# Patient Record
Sex: FEMALE | Race: WHITE | NOT HISPANIC OR LATINO | URBAN - METROPOLITAN AREA
[De-identification: names, ages, dates, MRNs, and addresses within clinical notes are randomized per-mention and may not be internally consistent; named-entity substitution may affect disease eponyms.]

---

## 2023-11-27 ENCOUNTER — HOSPITAL ENCOUNTER (OUTPATIENT)
Dept: RADIOLOGY | Facility: HOSPITAL | Age: 48
Discharge: HOME | End: 2023-11-27
Payer: COMMERCIAL

## 2023-11-27 DIAGNOSIS — Z12.39 ENCOUNTER FOR OTHER SCREENING FOR MALIGNANT NEOPLASM OF BREAST: ICD-10-CM

## 2023-11-27 PROCEDURE — 2550000001 HC RX 255 CONTRASTS: Performed by: NURSE PRACTITIONER

## 2023-11-27 PROCEDURE — 6100000003 BI MR BREAST BILATERAL WITH CONTRAST FAST SCREENING SELF PAY: Mod: 50

## 2023-11-27 PROCEDURE — A9575 INJ GADOTERATE MEGLUMI 0.1ML: HCPCS | Performed by: NURSE PRACTITIONER

## 2023-11-27 RX ORDER — GADOTERATE MEGLUMINE 376.9 MG/ML
13 INJECTION INTRAVENOUS
Status: COMPLETED | OUTPATIENT
Start: 2023-11-27 | End: 2023-11-27

## 2023-11-27 RX ADMIN — GADOTERATE MEGLUMINE 13 ML: 376.9 INJECTION INTRAVENOUS at 08:28

## 2024-03-08 ENCOUNTER — OFFICE VISIT (OUTPATIENT)
Dept: OBSTETRICS AND GYNECOLOGY | Facility: CLINIC | Age: 49
End: 2024-03-08
Payer: COMMERCIAL

## 2024-03-08 VITALS
BODY MASS INDEX: 23.63 KG/M2 | SYSTOLIC BLOOD PRESSURE: 112 MMHG | DIASTOLIC BLOOD PRESSURE: 71 MMHG | HEIGHT: 66 IN | WEIGHT: 147 LBS

## 2024-03-08 DIAGNOSIS — Z01.419 ENCOUNTER FOR GYNECOLOGICAL EXAMINATION WITHOUT ABNORMAL FINDING: Primary | ICD-10-CM

## 2024-03-08 PROCEDURE — 1036F TOBACCO NON-USER: CPT | Performed by: OBSTETRICS & GYNECOLOGY

## 2024-03-08 PROCEDURE — 99396 PREV VISIT EST AGE 40-64: CPT | Performed by: OBSTETRICS & GYNECOLOGY

## 2024-03-08 RX ORDER — TAMOXIFEN CITRATE 20 MG/1
20 TABLET ORAL DAILY
COMMUNITY
Start: 2020-10-22

## 2024-03-08 RX ORDER — BRIMONIDINE TARTRATE 2 MG/ML
1 SOLUTION/ DROPS OPHTHALMIC 2 TIMES DAILY
COMMUNITY

## 2024-03-08 NOTE — PROGRESS NOTES
Raysa Guillen is a 48 y.o. female here for a routine exam. Current complaints: She has family history of breast cancer.  She follows with breast specialist and had an MRI in 2023.  She alternates with mammograms.    She has regular cycles.  Occasional heavy for 1 or 2 days.  No pain, no dysuria, no change in bowel habits or vaginal discharge.    Her  has vasectomy for contraception.  We discussed her oldest is graduating from high school and they are planning a family trip to [place].   Pt is current on her colonoscopy.. Personal health questionnaire reviewed: yes.     Gynecologic History  Patient's last menstrual period was 2024.  Contraception: vasectomy  Last Pap: 3/3/23. Results were: normal  Last mammogram: 23. Results were: normal    Obstetric History  OB History    Para Term  AB Living   3 2           SAB IAB Ectopic Multiple Live Births                  # Outcome Date GA Lbr Riky/2nd Weight Sex Delivery Anes PTL Lv   3             2 Para            1 Para                Objective   Constitutional: Alert and in no acute distress. Well developed, well nourished.   Head and Face: Head and face: Normal.    Eyes: Normal external exam - nonicteric sclera, extraocular movements intact (EOMI) and no ptosis.   Neck: No neck asymmetry. Supple. Thyroid not enlarged and there were no palpable thyroid nodules.    Pulmonary: No respiratory distress.   Chest: Breasts: Normal appearance, no nipple discharge and no skin changes. Palpation of breasts and axillae: No palpable mass and no axillary lymphadenopathy.   Abdomen: Soft nontender; no abdominal mass palpated. No organomegaly. No hernias.   Genitourinary: External genitalia: Normal. No inguinal lymphadenopathy. Bartholin's Urethral and Skenes Glands: Normal. Urethra: Normal.  Bladder: Normal on palpation. Vagina: Normal. Cervix: Normal.  Uterus: Normal.  Right Adnexa/parametria: Normal.  Left  Adnexa/parametria: Normal.  Inspection of Perianal Area: Normal.   Musculoskeletal: No joint swelling seen, normal movements of all extremities.   Skin: Normal skin color and pigmentation, normal skin turgor, and no rash.   Neurologic: Non-focal. Grossly intact.   Psychiatric: Alert and oriented x 3. Affect normal to patient baseline. Mood: Appropriate.  Physical Exam     Assessment/Plan   Healthy female exam.  This is a 48-year-old female with a normal exam.  No Pap was sent, she is high risk HPV negative.    She is current on her breast imaging including MRI with the breast specialist.  She is current on her colonoscopy.    She uses ibuprofen 600mg intermittently for menses, does not require refill.  I will see her in 1 year.  Education reviewed: self breast exams.

## 2024-04-30 NOTE — PROGRESS NOTES
Violeta Guillen female   1975 48 y.o.   [id number]      Chief Complaint  High risk surveillance care, annual mammogram and exam     History Of Present Illness  Violeta Guillen is a very pleasant 48 year old  female [facility] care. 2020 Libia Gardner performed left breast Magseed localized excisional biopsy demonstrating atypical ductal hyperplasia (ADH), atypical lobular hyperplasia (ALH), small radical scar and other benign findings. She also has a history of right breast excisional biopsy, benign fibroadenoma in 2017. She has family history of breast cancer in her mother, age 51, BRCA negative and maternal aunt, age 51. She has family history of ovarian cancer in another maternal aunt, age 61. She saw Genetics and testing was not recommended because her mother is BRCA negative. 10/2020 she started Tamoxifen for risk reduction, currently taking and tolerating well. She presents today for annual mammogram and exam. She denies any new masses or lumps.      BREAST IMAGIN2023 Bilateral Fast MRI, indicates BI-RADS Category 1. 2023 Bilateral screening mammogram, indicates BI-RADS Category 1.      FEMALE HISTORY: menarche age 14, ,  section Ã--2, first birth 30, did not breastfeed, previous OCPs, premenopausal, LMP 2024, monthly cycles, heterogeneously dense tissue     FAMILY CANCER HISTORY:  Mother: Breast cancer age 51, BRCA negative  Maternal Aunt (1): Breast cancer age 51, uterine cancer age 37, bladder cancer age 49, lung cancer age 71  Maternal Aunt (2): Ovarian cancer age 61  Maternal Grandfather: Lung cancer age 73      Surgical History  She has a past surgical history that includes  section, classic (2017); Breast biopsy (2017); BI mammo guided breast left localization (Left, 2020); Breast lumpectomy (2020); and  section, low transverse (, ).     Social History  She reports  that she has never smoked. She has never used smokeless tobacco. She reports that she does not drink alcohol and does not use drugs.    Family History  Family History   Problem Relation Name Age of Onset    Breast cancer Mother Virginia     Hypertension Father Jan     Arthritis Paternal Grandmother Monse     Diabetes Paternal Grandfather Ezra     Breast cancer Mother's Sister          Allergies  Penicillins    Medications  Current Outpatient Medications   Medication Instructions    brimonidine (AlphaGAN) 0.2 % ophthalmic solution 1 drop, 2 times daily    tamoxifen (NOLVADEX) 20 mg, oral, Daily         REVIEW OF SYSTEMS    Constitutional:  Negative for appetite change, fatigue, fever and unexpected weight change.   HENT:  Negative for ear pain, hearing loss, nosebleeds, sore throat and trouble swallowing.    Eyes:  Negative for discharge, itching and visual disturbance.   Respiratory:  Negative for cough, chest tightness and shortness of breath.    Cardiovascular:  Negative for chest pain, palpitations and leg swelling.   Breast: as indicated in HPI  Gastrointestinal:  Negative for abdominal pain, constipation, diarrhea and nausea.   Endocrine: Negative for cold intolerance and heat intolerance.   Genitourinary:  Negative for dysuria, frequency, hematuria, pelvic pain and vaginal bleeding.   Musculoskeletal:  Negative for arthralgias, back pain, gait problem, joint swelling and myalgias.   Skin:  Negative for color change and rash.   Allergic/Immunologic: Negative for environmental allergies and food allergies.   Neurological:  Negative for dizziness, tremors, speech difficulty, weakness, numbness and headaches.   Hematological:  Does not bruise/bleed easily.   Psychiatric/Behavioral:  Negative for agitation, dysphoric mood and sleep disturbance. The patient is not nervous/anxious.         Past Medical History  She has a past medical history of Benign neoplasm of right breast (05/11/2017), Personal history of  other diseases of the female genital tract (11/13/2014), Personal history of other specified conditions (11/13/2014), and Unspecified lump in the right breast, unspecified quadrant (01/24/2017).     Physical Exam  Patient is alert and oriented x3,and in a relaxed and appropriate mood. Her gait is steady and hand grasps are equal. Sclera is clear. The breasts are small and nearly symmetrical. The right breast has a well-healed partial circumareolar incision from excisional biopsy, benign fibroadenoma. The left breast has a well-healed partial circumareolar incision from excisional biopsy. The tissue of both breasts is dense most of the breast without palpable abnormalities, discrete nodules or masses. The skin and nipples appear normal. There is no cervical, supraclavicular, or axillary lymphadenopathy palpable. Heart rate and rhythm normal, S1 and S2 appreciated. The lungs are clear to auscultation bilaterally. Abdomen is soft, non-tender.     Physical Exam  Chest:              Last Recorded Vitals  Vitals:    05/09/24 0922   BP: 115/76   Pulse: 76   Temp: 37.3 °C (99.1 °F)   SpO2: 100%       Risk Profiles              Relevant Results   Time was spent viewing digital images of the radiology testing with the patient. I explained the results in depth, along with suggested explanation for follow up recommendations based on the testing results. BI-RADS Category 1    Imaging    Narrative & Impression   Interpreted By:  Lesli Ashraf and Abuhamdeh Imran   STUDY:  BI MAMMO BILATERAL SCREENING TOMOSYNTHESIS;  5/9/2024 9:18 am      ACCESSION NUMBER(S):  [id number]      ORDERING CLINICIAN:  DAE DASH      INDICATION:  Screening. History of bilateral benign breast biopsies (atypical  hyperplasia on the left and fibroadenoma in the right). Family  history of breast cancer diagnosed in her mother.      COMPARISON:  05/08/2023, 05/10/2022, and 05/20/2021.      FINDINGS:  2D and tomosynthesis images were reviewed at 1  mm slice thickness.      Density:  The breast tissue is heterogeneously dense, which may  obscure small masses.      The parenchymal pattern is stable. No suspicious masses or  calcifications are identified.      IMPRESSION:  No mammographic evidence of malignancy.      BI-RADS CATEGORY:      BI-RADS Category:  1 Negative.  Recommendation:  Annual Screening.  Recommended Date:  1 Year.  Laterality:  Bilateral.       MR breast bilateral w IV contrast fast screening self pay 11/27/2023    Narrative  Interpreted By:  Ciara Trinh,  STUDY:  BI MR BREAST BILATERAL WITH CONTRAST FAST SCREENING SELF PAY;  11/27/2023 8:35 am    ACCESSION NUMBER(S):  [id number]    ORDERING CLINICIAN:  DAE DASH    INDICATION:  Dense breast tissue on mammography. Bilateral benign excisional  biopsies. Strong family history of breast cancer.    COMPARISON:  Comparison to MRI 11/14/2022, 07/12/2021, 07/10/2019, 10/22/2018  correlation to most recent mammograms 05/08/2023    TECHNIQUE:  Using a dedicated breast coil, STIR axial and T1-weighted fat  saturation axial images of the breasts were obtained, the latter both  before and after intravenous administration of Gadolinium DTPA.    Intravenous contrast:  13 mL of Dotarem    FINDINGS:  Density:  The breast tissue is heterogeneously dense, which may  obscure small masses.    There is symmetric moderate bilateral background enhancement.    RIGHT BREAST: There is post excisional biopsy scarring and  susceptibility artifact in the inferomedial quadrant. No suspicious  mass or nonmass enhancement is identified.    No axillary or internal mammary lymphadenopathy is appreciated.    LEFT BREAST: There is post excisional biopsy scarring and  susceptibility artifact in the 12 o'clock position. No suspicious  mass or nonmass enhancement is identified.    No axillary or internal mammary lymphadenopathy is appreciated.    NON-BREAST FINDINGS:  The T2 hyperintense nodule in the right lung  on  STIR axial slice 35 is similar when compared to priors and previously  characterized on chest CT 07/24/2019.    Impression  No MRI evidence of malignancy in either breast.    BI-RADS CATEGORY:    BI-RADS Category:  2 Benign.  Recommendation:  Routine Screening Mammogram in 1 Year.  Recommended Date:  1 Year.  Laterality:  Bilateral.    For any future breast imaging appointments, please call [phone]  ([phone]).      MACRO:  None    Signed by: Ciara Trinh 11/28/2023 12:16 PM  Dictation workstation:   [id number]       Assessment/Plan   High risk breast surveillance care, normal clinical exam and imaging, history of left ADH/ALH, history of right excisional biopsy, benign fibroadenoma, family history of breast cancer, heterogeneously dense tissue, Tamoxifen therapy     Plan: Return in one year for bilateral screening mammogram and office visit. Fast MRI in November 2024. Continue Tamoxifen 20mg daily until 10/2025.     Patient Discussion/Summary  Your clinical examination and imaging are normal. Please return in one year for bilateral screening mammogram and office visit or sooner if you have any problems or concerns. Continue Tamoxifen 20mg daily.     You can see your health information, review clinical summaries from office visits & test results online when you follow your health with MY  Chart, a personal health record. To sign up go to [email or web address]. If you need assistance with signing up or trouble getting into your account call [facility] Patient Line 24/7 at [phone].    My office phone number is [phone] if you need to get in touch with me or have additional questions or concerns. Thank you for choosing [facility] and trusting me as your healthcare provider. I look forward to seeing you again at your next office visit. I am honored to be a provider on your health care team and I remain dedicated to helping you achieve your health goals.      Annalisa Gaines,  APRN-CNP

## 2024-05-09 ENCOUNTER — HOSPITAL ENCOUNTER (OUTPATIENT)
Dept: RADIOLOGY | Facility: CLINIC | Age: 49
Discharge: HOME | End: 2024-05-09
Payer: COMMERCIAL

## 2024-05-09 ENCOUNTER — OFFICE VISIT (OUTPATIENT)
Dept: SURGICAL ONCOLOGY | Facility: CLINIC | Age: 49
End: 2024-05-09
Payer: COMMERCIAL

## 2024-05-09 VITALS
HEART RATE: 76 BPM | DIASTOLIC BLOOD PRESSURE: 76 MMHG | BODY MASS INDEX: 23.89 KG/M2 | SYSTOLIC BLOOD PRESSURE: 115 MMHG | WEIGHT: 148 LBS | OXYGEN SATURATION: 100 % | TEMPERATURE: 99.1 F

## 2024-05-09 VITALS — BODY MASS INDEX: 24.11 KG/M2 | WEIGHT: 150 LBS | HEIGHT: 66 IN

## 2024-05-09 DIAGNOSIS — Z12.39 ENCOUNTER FOR OTHER SCREENING FOR MALIGNANT NEOPLASM OF BREAST: ICD-10-CM

## 2024-05-09 DIAGNOSIS — Z12.39 BREAST CANCER SCREENING, HIGH RISK PATIENT: Primary | ICD-10-CM

## 2024-05-09 DIAGNOSIS — Z79.810 USE OF TAMOXIFEN (NOLVADEX): ICD-10-CM

## 2024-05-09 DIAGNOSIS — R92.30 DENSE BREAST TISSUE: ICD-10-CM

## 2024-05-09 PROCEDURE — 1036F TOBACCO NON-USER: CPT | Performed by: NURSE PRACTITIONER

## 2024-05-09 PROCEDURE — 77067 SCR MAMMO BI INCL CAD: CPT | Mod: BILATERAL PROCEDURE | Performed by: RADIOLOGY

## 2024-05-09 PROCEDURE — 99214 OFFICE O/P EST MOD 30 MIN: CPT | Performed by: NURSE PRACTITIONER

## 2024-05-09 PROCEDURE — 77067 SCR MAMMO BI INCL CAD: CPT

## 2024-05-09 PROCEDURE — 77063 BREAST TOMOSYNTHESIS BI: CPT | Mod: BILATERAL PROCEDURE | Performed by: RADIOLOGY

## 2024-05-09 ASSESSMENT — PAIN SCALES - GENERAL: PAINLEVEL: 0-NO PAIN

## 2024-05-09 NOTE — PATIENT INSTRUCTIONS
Your clinical examination and imaging are normal. Please return in one year for bilateral screening mammogram and office visit or sooner if you have any problems or concerns. Continue Tamoxifen 20mg daily.     You can see your health information, review clinical summaries from office visits & test results online when you follow your health with MY  Chart, a personal health record. To sign up go to [email or web address]. If you need assistance with signing up or trouble getting into your account call [employer] Patient Line 24/7 at [phone].    My office phone number is [phone] if you need to get in touch with me or have additional questions or concerns. Thank you for choosing [facility] and trusting me as your healthcare provider. I look forward to seeing you again at your next office visit. I am honored to be a provider on your health care team and I remain dedicated to helping you achieve your health goals.

## 2024-06-21 DIAGNOSIS — N92.0 EXCESSIVE AND FREQUENT MENSTRUATION WITH REGULAR CYCLE: ICD-10-CM

## 2024-06-25 RX ORDER — IBUPROFEN 600 MG/1
600 TABLET ORAL EVERY 6 HOURS PRN
Qty: 60 TABLET | Refills: 3 | Status: SHIPPED | OUTPATIENT
Start: 2024-06-25

## 2024-11-11 ENCOUNTER — ANCILLARY PROCEDURE (OUTPATIENT)
Dept: URGENT CARE | Age: 49
End: 2024-11-11
Payer: COMMERCIAL

## 2024-11-11 ENCOUNTER — OFFICE VISIT (OUTPATIENT)
Dept: URGENT CARE | Age: 49
End: 2024-11-11
Payer: COMMERCIAL

## 2024-11-11 VITALS
OXYGEN SATURATION: 98 % | TEMPERATURE: 98 F | SYSTOLIC BLOOD PRESSURE: 124 MMHG | DIASTOLIC BLOOD PRESSURE: 79 MMHG | HEART RATE: 83 BPM

## 2024-11-11 DIAGNOSIS — R05.1 ACUTE COUGH: ICD-10-CM

## 2024-11-11 DIAGNOSIS — R05.1 ACUTE COUGH: Primary | ICD-10-CM

## 2024-11-11 PROCEDURE — 71046 X-RAY EXAM CHEST 2 VIEWS: CPT

## 2024-11-11 RX ORDER — PROMETHAZINE HYDROCHLORIDE AND DEXTROMETHORPHAN HYDROBROMIDE 6.25; 15 MG/5ML; MG/5ML
5 SYRUP ORAL EVERY 4 HOURS PRN
Qty: 120 ML | Refills: 0 | Status: SHIPPED | OUTPATIENT
Start: 2024-11-11 | End: 2024-12-11

## 2024-11-11 NOTE — PROGRESS NOTES
Subjective   History of Present Illness: Violeta Guillen is a 49 y.o. female. They present today with a chief complaint of Cough (Chest congestion for a week). Pt used OTC cough meds to alleviate the cough.         Past Medical History  Allergies as of 2024 - Reviewed 2024   Allergen Reaction Noted    Penicillins Hives, Itching, and Rash 10/30/2010       (Not in a hospital admission)       Past Medical History:   Diagnosis Date    Benign neoplasm of right breast 2017    Fibroadenoma of right breast    Personal history of other diseases of the female genital tract 2014    History of abnormal menstrual cycle    Personal history of other specified conditions 2014    History of abdominal pain    Unspecified lump in the right breast, unspecified quadrant 2017    Breast mass, right       Past Surgical History:   Procedure Laterality Date    BI MAMMO GUIDED BREAST LEFT LOCALIZATION Left 2020    BI MAMMO GUIDED LOCALIZATION BREAST LEFT 2020 [facility]    BREAST BIOPSY  2017    Biopsy Breast Open    BREAST LUMPECTOMY  2020     SECTION, CLASSIC  2017     Section     SECTION, LOW TRANSVERSE  2010        reports that she has never smoked. She has never used smokeless tobacco. She reports that she does not drink alcohol and does not use drugs.    Review of Systems  Review of Systems                               Objective    Vitals:    24 1600   BP: 124/79   Pulse: 83   Temp: 36.7 °C (98 °F)   SpO2: 98%     No LMP recorded. Patient is premenopausal.    Physical Exam  HENT:      Head: Normocephalic and atraumatic.      Nose: Nose normal.      Mouth/Throat:      Mouth: Mucous membranes are moist.   Eyes:      Extraocular Movements: Extraocular movements intact.      Conjunctiva/sclera: Conjunctivae normal.      Pupils: Pupils are equal, round, and reactive to light.   Cardiovascular:      Rate and Rhythm: Normal rate  and regular rhythm.      Heart sounds: No murmur heard.  Pulmonary:      Effort: Pulmonary effort is normal.      Breath sounds: Normal breath sounds. No wheezing, rhonchi or rales.   Skin:     General: Skin is warm.   Neurological:      Mental Status: She is alert and oriented to person, place, and time.   Psychiatric:         Mood and Affect: Mood normal.         Behavior: Behavior normal.             Point of Care Test & Imaging Results from this visit  No results found for this visit on 11/11/24.   XR chest 2 views    Result Date: 11/11/2024  Interpreted By:  Esteban Stone, STUDY: XR CHEST 2 VIEWS; 11/11/2024 4:52 pm   INDICATION: Signs/Symptoms:cough   COMPARISON: 07/16/2019   ACCESSION NUMBER(S): [id number]   ORDERING CLINICIAN: LEVAR QUILES   TECHNIQUE: PA and LAT views of the chest were obtained.   FINDINGS: The cardiomediastinal silhouette is unremarkable. The lungs are clear. No pleural effusion is identified. No significant airspace opacity.   The osseous structures are intact.       No acute cardiopulmonary process.     Signed by: Esteban Stone 11/11/2024 6:09 PM Dictation workstation:   [id number]     Diagnostic study results (if any) were reviewed by Levar Quiles PA-C.    Assessment/Plan   Allergies, medications, history, and pertinent labs/EKGs/Imaging reviewed by Levar Quiles PA-C.     Medical Decision Making  -         Patient is educated about their diagnoses.     -          Discussed medications benefits and adverse effects.     -          Answered all patient’s questions.     -          Patient will call 911 or go to the nearest ED if worsen symptoms .     -          Patient is agreeable to the plan of care and is deemed stable upon discharge.     -          Follow up with your primary care provider in two days.      Orders and Diagnoses  Diagnoses and all orders for this visit:  Acute cough  -     XR chest 2 views; Future  -     promethazine-DM (Phenergan-DM) 6.25-15 mg/5 mL  syrup; Take 5 mL by mouth every 4 hours if needed for cough.      Medical Admin Record      Patient disposition: Home    Electronically signed by Levar Haywood PA-C  6:10 PM

## 2024-11-18 ENCOUNTER — HOSPITAL ENCOUNTER (OUTPATIENT)
Dept: RADIOLOGY | Facility: HOSPITAL | Age: 49
Discharge: HOME | End: 2024-11-18

## 2024-11-18 DIAGNOSIS — R92.30 DENSE BREAST TISSUE: ICD-10-CM

## 2024-11-18 DIAGNOSIS — Z12.39 BREAST CANCER SCREENING, HIGH RISK PATIENT: ICD-10-CM

## 2024-11-18 PROCEDURE — A9575 INJ GADOTERATE MEGLUMI 0.1ML: HCPCS | Performed by: NURSE PRACTITIONER

## 2024-11-18 PROCEDURE — 6100000003 BI MR BREAST BILATERAL WITH CONTRAST FAST SCREENING SELF PAY

## 2024-11-18 PROCEDURE — 2550000001 HC RX 255 CONTRASTS: Performed by: NURSE PRACTITIONER

## 2024-11-18 RX ORDER — GADOTERATE MEGLUMINE 376.9 MG/ML
13 INJECTION INTRAVENOUS
Status: COMPLETED | OUTPATIENT
Start: 2024-11-18 | End: 2024-11-18

## 2024-11-19 DIAGNOSIS — Z12.39 BREAST CANCER SCREENING, HIGH RISK PATIENT: Primary | ICD-10-CM

## 2024-11-19 RX ORDER — TAMOXIFEN CITRATE 20 MG/1
20 TABLET ORAL DAILY
Qty: 90 TABLET | Refills: 3 | Status: SHIPPED | OUTPATIENT
Start: 2024-11-19

## 2025-03-14 ENCOUNTER — APPOINTMENT (OUTPATIENT)
Dept: OBSTETRICS AND GYNECOLOGY | Facility: CLINIC | Age: 50
End: 2025-03-14
Payer: COMMERCIAL

## 2025-03-14 VITALS
HEIGHT: 66 IN | DIASTOLIC BLOOD PRESSURE: 69 MMHG | HEART RATE: 73 BPM | BODY MASS INDEX: 24.43 KG/M2 | SYSTOLIC BLOOD PRESSURE: 105 MMHG | WEIGHT: 152 LBS

## 2025-03-14 DIAGNOSIS — Z01.419 ENCOUNTER FOR GYNECOLOGICAL EXAMINATION WITHOUT ABNORMAL FINDING: Primary | ICD-10-CM

## 2025-03-14 PROCEDURE — 99396 PREV VISIT EST AGE 40-64: CPT | Performed by: OBSTETRICS & GYNECOLOGY

## 2025-03-14 PROCEDURE — 3008F BODY MASS INDEX DOCD: CPT | Performed by: OBSTETRICS & GYNECOLOGY

## 2025-03-14 NOTE — PROGRESS NOTES
Raysa Guillen is a 49 y.o. female here for a routine exam.  Her cycles are typically regular, she did skip 2025.  She has her 's vasectomy for contraception.    She has no pelvic pain, no dysuria, no change in bowel habits or vaginal discharge.    There is family history of breast cancer and she is followed by the breast specialist.  She is on tamoxifen.    She is current on her colonoscopy.  Personal health questionnaire reviewed: yes.     Gynecologic History  Patient's last menstrual period was 2025 (approximate).  Contraception: vasectomy  Last Pap: 3/3/23. Results were: normal  Last mammogram: 23. Results were: normal    Obstetric History  OB History    Para Term  AB Living   3 2           SAB IAB Ectopic Multiple Live Births                  # Outcome Date GA Lbr Riky/2nd Weight Sex Type Anes PTL Lv   3             2 Para            1 Para                Objective   Constitutional: Alert and in no acute distress. Well developed, well nourished.   Head and Face: Head and face: Normal.    Eyes: Normal external exam - nonicteric sclera, extraocular movements intact (EOMI) and no ptosis.   Neck: No neck asymmetry. Supple. Thyroid not enlarged and there were no palpable thyroid nodules.    Pulmonary: No respiratory distress.   Chest: Breasts: Normal appearance, no nipple discharge and no skin changes. Palpation of breasts and axillae: No palpable mass and no axillary lymphadenopathy.   Abdomen: Soft nontender; no abdominal mass palpated. No organomegaly. No hernias.   Genitourinary: External genitalia: Normal. No inguinal lymphadenopathy. Bartholin's Urethral and Skenes Glands: Normal. Urethra: Normal.  Bladder: Normal on palpation. Vagina: Normal. Cervix: Normal.  Uterus: Normal.  Right Adnexa/parametria: Normal.  Left Adnexa/parametria: Normal.  Inspection of Perianal Area: Normal.   Musculoskeletal: No joint swelling seen, normal movements of all  extremities.   Skin: Normal skin color and pigmentation, normal skin turgor, and no rash.   Neurologic: Non-focal. Grossly intact.   Psychiatric: Alert and oriented x 3. Affect normal to patient baseline. Mood: Appropriate.  Physical Exam     Assessment/Plan   Healthy female exam.  This is a 49-year-old female with a normal exam.  No Pap smear was sent, she is high risk HPV negative in 2023.    She is followed by the breast specialist and she is current on her breast imaging.    Patient is on tamoxifen.  She does occasionally have mild menopausal symptoms, mainly night sweats.  We discussed over-the-counter options could be Estroven, black cohosh,  from [employer] from  [place].    I will see her routinely in 1 year.  Education reviewed: self breast exams.  Contraception: vasectomy.

## 2025-04-24 NOTE — PROGRESS NOTES
Violeta Guillen female   1975 49 y.o.   [id number]      Chief Complaint  High risk surveillance care, annual mammogram and exam     History Of Present Illness  Violeta Guillen is a very pleasant 49 year old  female [facility]. 2020 Libia Gardner performed left breast Magseed localized excisional biopsy demonstrating atypical ductal hyperplasia (ADH), atypical lobular hyperplasia (ALH), small radical scar and other benign findings. She also has a history of right breast excisional biopsy, benign fibroadenoma in 2017. She has family history of breast cancer in her mother, age 51, BRCA negative and maternal aunt, age 51. She has family history of ovarian cancer in another maternal aunt, age 61. She saw Genetics and testing was not recommended because her mother is BRCA negative. 10/2020 she started Tamoxifen for risk reduction, currently taking and tolerating well. She presents today for annual mammogram and exam. She denies any new masses or lumps.      BREAST IMAGIN2024 Bilateral Fast MRI, indicates BI-RADS Category 2. 2024 Bilateral screening mammogram, indicates BI-RADS Category 1.      FEMALE HISTORY: menarche age 14, , first birth age 30, did not breastfeed, previous OCPs, perimenopausal, monthly cycles, heterogeneously dense tissue     FAMILY CANCER HISTORY:  Mother: Breast cancer age 51, BRCA negative  Maternal Aunt (1): Breast cancer age 51, uterine cancer age 37, bladder cancer age 49, lung cancer age 71  Maternal Aunt (2): Ovarian cancer age 61  Maternal Grandfather: Lung cancer age 73      Surgical History  She has a past surgical history that includes  section, classic (2017); Breast biopsy (2017); BI mammo guided breast left localization (Left, 2020); Breast lumpectomy (2020); and  section, low transverse (, ).     Social History  She reports that she has never smoked. She  has never used smokeless tobacco. She reports that she does not drink alcohol and does not use drugs.    Family History  Family History   Problem Relation Name Age of Onset    Breast cancer Mother Virginia     Hypertension Father Jan     Arthritis Paternal Grandmother Monse     Diabetes Paternal Grandfather Ezra     Breast cancer Mother's Sister Isabel Hunter         Allergies  Penicillins    Medications  Current Outpatient Medications   Medication Instructions    brimonidine (AlphaGAN) 0.2 % ophthalmic solution 1 drop, 2 times daily    ibuprofen 600 mg, oral, Every 6 hours PRN    tamoxifen (NOLVADEX) 20 mg, oral, Daily         REVIEW OF SYSTEMS    Constitutional:  Negative for appetite change, fatigue, fever and unexpected weight change.   HENT:  Negative for ear pain, hearing loss, nosebleeds, sore throat and trouble swallowing.    Eyes:  Negative for discharge, itching and visual disturbance.   Respiratory:  Negative for cough, chest tightness and shortness of breath.    Cardiovascular:  Negative for chest pain, palpitations and leg swelling.   Breast: as indicated in HPI  Gastrointestinal:  Negative for abdominal pain, constipation, diarrhea and nausea.   Endocrine: Negative for cold intolerance and heat intolerance.   Genitourinary:  Negative for dysuria, frequency, hematuria, pelvic pain and vaginal bleeding.   Musculoskeletal:  Negative for arthralgias, back pain, gait problem, joint swelling and myalgias.   Skin:  Negative for color change and rash.   Allergic/Immunologic: Negative for environmental allergies and food allergies.   Neurological:  Negative for dizziness, tremors, speech difficulty, weakness, numbness and headaches.   Hematological:  Does not bruise/bleed easily.   Psychiatric/Behavioral:  Negative for agitation, dysphoric mood and sleep disturbance. The patient is not nervous/anxious.         Past Medical History  She has a past medical history of Atypical ductal hyperplasia, breast  (2020), Benign neoplasm of right breast (05/11/2017), Breast cyst (2017), Personal history of other diseases of the female genital tract (11/13/2014), Personal history of other specified conditions (11/13/2014), and Unspecified lump in the right breast, unspecified quadrant (01/24/2017).     Physical Exam  Patient is alert and oriented x3,and in a relaxed and appropriate mood. Her gait is steady and hand grasps are equal. Sclera is clear. The breasts are small and nearly symmetrical. The right breast has a well-healed partial circumareolar incision from excisional biopsy, benign fibroadenoma. The left breast has a well-healed partial circumareolar incision from excisional biopsy. The tissue of both breasts is dense most of the breast without palpable abnormalities, discrete nodules or masses. The skin and nipples appear normal. There is no cervical, supraclavicular, or axillary lymphadenopathy palpable. Heart rate and rhythm normal, S1 and S2 appreciated. The lungs are clear to auscultation bilaterally.    Physical Exam  Chest:              Last Recorded Vitals  Vitals:    05/15/25 1317   BP: 114/72   Pulse: 80   Temp: 36.9 °C (98.4 °F)   SpO2: 100%         Risk Profile      Relevant Results   Time was spent viewing digital images of the radiology testing with the patient, results pending    Assessment/Plan   High risk breast surveillance care, normal clinical exam, screening mammogram, history of left ADH/ALH, history of right excisional biopsy, benign fibroadenoma, family history of breast cancer, heterogeneously dense tissue, Tamoxifen therapy     Plan: Return in one year for bilateral screening mammogram and office visit. Fast MRI in November 2025. Continue Tamoxifen 20mg daily until 10/31/2025.     Patient Discussion/Summary  Your clinical examination is normal. Please return in one year for bilateral screening mammogram and office visit or sooner if you have any problems or concerns. Continue Tamoxifen 20mg  daily until 10/31/2025.     You can see your health information, review clinical summaries from office visits & test results online when you follow your health with MY  Chart, a personal health record. To sign up go to [email or web address]. If you need assistance with signing up or trouble getting into your account call [employer] Patient Line 24/7 at [phone].    My office phone number is [phone] if you need to get in touch with me or have additional questions or concerns. Thank you for choosing [facility] and trusting me as your healthcare provider. I look forward to seeing you again at your next office visit. I am honored to be a provider on your health care team and I remain dedicated to helping you achieve your health goals.      Annalisa Gaines, CHRISTOPHER-CNP

## 2025-05-12 ENCOUNTER — APPOINTMENT (OUTPATIENT)
Dept: RADIOLOGY | Facility: CLINIC | Age: 50
End: 2025-05-12
Payer: COMMERCIAL

## 2025-05-12 ENCOUNTER — APPOINTMENT (OUTPATIENT)
Dept: SURGICAL ONCOLOGY | Facility: CLINIC | Age: 50
End: 2025-05-12
Payer: COMMERCIAL

## 2025-05-15 ENCOUNTER — OFFICE VISIT (OUTPATIENT)
Dept: SURGICAL ONCOLOGY | Facility: CLINIC | Age: 50
End: 2025-05-15
Payer: COMMERCIAL

## 2025-05-15 ENCOUNTER — HOSPITAL ENCOUNTER (OUTPATIENT)
Dept: RADIOLOGY | Facility: CLINIC | Age: 50
Discharge: HOME | End: 2025-05-15
Payer: COMMERCIAL

## 2025-05-15 VITALS — BODY MASS INDEX: 24.53 KG/M2 | WEIGHT: 152 LBS

## 2025-05-15 VITALS
DIASTOLIC BLOOD PRESSURE: 72 MMHG | TEMPERATURE: 98.4 F | SYSTOLIC BLOOD PRESSURE: 114 MMHG | WEIGHT: 156 LBS | BODY MASS INDEX: 25.18 KG/M2 | HEART RATE: 80 BPM | OXYGEN SATURATION: 100 %

## 2025-05-15 DIAGNOSIS — Z79.810 USE OF TAMOXIFEN (NOLVADEX): ICD-10-CM

## 2025-05-15 DIAGNOSIS — Z12.39 BREAST CANCER SCREENING, HIGH RISK PATIENT: ICD-10-CM

## 2025-05-15 DIAGNOSIS — R92.30 DENSE BREAST TISSUE: Primary | ICD-10-CM

## 2025-05-15 PROCEDURE — 77067 SCR MAMMO BI INCL CAD: CPT

## 2025-05-15 PROCEDURE — 1036F TOBACCO NON-USER: CPT | Performed by: NURSE PRACTITIONER

## 2025-05-15 PROCEDURE — 99214 OFFICE O/P EST MOD 30 MIN: CPT | Performed by: NURSE PRACTITIONER

## 2025-05-15 ASSESSMENT — PAIN SCALES - GENERAL: PAINLEVEL_OUTOF10: 0-NO PAIN

## 2025-05-15 NOTE — PATIENT INSTRUCTIONS
Your clinical examination is normal. Please return in one year for bilateral screening mammogram and office visit or sooner if you have any problems or concerns. Continue Tamoxifen 20mg daily until 10/31/2025.     You can see your health information, review clinical summaries from office visits & test results online when you follow your health with MY  Chart, a personal health record. To sign up go to [email or web address]. If you need assistance with signing up or trouble getting into your account call [employer] Patient Line 24/7 at [phone].    My office phone number is [phone] if you need to get in touch with me or have additional questions or concerns. Thank you for choosing [facility] and trusting me as your healthcare provider. I look forward to seeing you again at your next office visit. I am honored to be a provider on your health care team and I remain dedicated to helping you achieve your health goals.

## 2025-07-27 DIAGNOSIS — N92.0 EXCESSIVE AND FREQUENT MENSTRUATION WITH REGULAR CYCLE: ICD-10-CM

## 2025-07-28 RX ORDER — IBUPROFEN 600 MG/1
600 TABLET, FILM COATED ORAL EVERY 6 HOURS PRN
Qty: 60 TABLET | Refills: 3 | Status: SHIPPED | OUTPATIENT
Start: 2025-07-28

## 2026-03-20 ENCOUNTER — APPOINTMENT (OUTPATIENT)
Facility: CLINIC | Age: 51
End: 2026-03-20
Payer: COMMERCIAL